# Patient Record
Sex: FEMALE | Race: WHITE
[De-identification: names, ages, dates, MRNs, and addresses within clinical notes are randomized per-mention and may not be internally consistent; named-entity substitution may affect disease eponyms.]

---

## 2019-02-08 ENCOUNTER — HOSPITAL ENCOUNTER (EMERGENCY)
Dept: HOSPITAL 80 - FED | Age: 59
Discharge: HOME | End: 2019-02-08
Payer: COMMERCIAL

## 2019-02-08 VITALS — SYSTOLIC BLOOD PRESSURE: 154 MMHG | DIASTOLIC BLOOD PRESSURE: 67 MMHG

## 2019-02-08 DIAGNOSIS — R11.2: Primary | ICD-10-CM

## 2019-02-08 DIAGNOSIS — Z87.19: ICD-10-CM

## 2019-02-08 DIAGNOSIS — R10.32: ICD-10-CM

## 2019-02-08 LAB — PLATELET # BLD: 324 10^3/UL (ref 150–400)

## 2019-02-08 PROCEDURE — 96361 HYDRATE IV INFUSION ADD-ON: CPT

## 2019-02-08 PROCEDURE — 96374 THER/PROPH/DIAG INJ IV PUSH: CPT

## 2019-02-08 PROCEDURE — 96376 TX/PRO/DX INJ SAME DRUG ADON: CPT

## 2019-02-08 PROCEDURE — 96375 TX/PRO/DX INJ NEW DRUG ADDON: CPT

## 2019-02-08 PROCEDURE — G0480 DRUG TEST DEF 1-7 CLASSES: HCPCS

## 2019-02-08 PROCEDURE — 74177 CT ABD & PELVIS W/CONTRAST: CPT

## 2019-02-08 PROCEDURE — 99285 EMERGENCY DEPT VISIT HI MDM: CPT

## 2019-02-08 NOTE — EDPHY
H & P


Stated Complaint: LLQ abd pain, n/v, "feels like my pancreatitis"


Source: Patient, EMS


Exam Limitations: No limitations





- Personal History


Current Tetanus Diphtheria and Acellular Pertussis (TDAP): Yes


Tetanus Vaccine Date: 2016





- Medical/Surgical History


Hx Asthma: No


Hx Chronic Respiratory Disease: No


Hx Diabetes: No


Hx Cardiac Disease: No


Hx Renal Disease: No


Hx Cirrhosis: No


Hx Alcoholism: No


Hx HIV/AIDS: No


Hx Splenectomy or Spleen Trauma: No


Other PMH: Divertiulitis, pancreatitis





- Social History


Smoking Status: Heavy smoker


Time Seen by Provider: 02/08/19 06:00


HPI/ROS: 





HPI


The patient presents with left lower quadrant abdominal pain for the last 

several hours which feels like her prior episodes of pancreatitis, she is 

brought in by ambulance from her car outside of a bar.  She and her  

were supposed to clean the bar but she was unable to because of the pain.  The 

pain is associated with nausea and vomiting, has been constant and is moderate 

in severity.  She has had this pain before with pancreatitis.  She had 1 

episode of pancreatitis 1 year ago and another about 15 years ago.  She does 

not drink alcohol.





REVIEW OF SYSTEMS


10 systems were reviewed and negative with the exception of the elements 

mentioned in the history of present illness.





PMHx:  History of pancreatitis, reported history of diverticulitis





Soc Hx:  Lives in [place], does not have a primary care doctor








PHYSICAL


General Appearance: Alert, uncomfortable appearing, dry heaving


Eyes: Pupils equal and round no pallor or injection


ENT, Mouth: Mucous membranes moist


Respiratory: There are no retractions, lungs are clear to auscultation


Cardiovascular:  Regular rate and rhythm 


Gastrointestinal:  Abdomen is soft, exam is inconsistent, sometimes she jumps 

when I press her left abdomen and sometimes she does not move


Neurological:  A&O, moves all extremities


Skin:  Warm and dry, no rashes


Musculoskeletal: Neck is supple non tender 


Extremities:  symmetrical, full range of motion 


Psychiatric:  Patient is oriented X 3, there is no agitation 


 (RiguzziMarcelina)


Constitutional: 


 Initial Vital Signs











Temperature (C)  36.4 C   02/08/19 06:00


 


Heart Rate  81   02/08/19 06:00


 


Respiratory Rate  20   02/08/19 06:00


 


Blood Pressure  184/109 H  02/08/19 06:00


 


O2 Sat (%)  100   02/08/19 06:00








 











O2 Delivery Mode               Room Air














Allergies/Adverse Reactions: 


 





Penicillins Allergy (Verified 02/08/19 06:00)


 








Home Medications: 














 Medication  Instructions  Recorded


 


Ondansetron Odt [Zofran Odt] 4 mg PO Q4PRN PRN #4 tab 02/08/19


 


Seroquel  02/08/19














Medical Decision Making





- Diagnostics


Imaging Results: 





CT abdomen and pelvis reviewed by me and discussed with Dr. Oppenheimer shows 

no acute pathology.  Mild intrahepatic duct dilatation.  This may be from her 

previous cholecystectomy or possible retained common bile duct stone.  We have 

no previous films for comparison (Elmer Patel)


ED Course/Re-evaluation: 





Care was signed over to me at 7:00 a.m..  I reviewed the labs.  Again I 

reviewed the CT.  Re-evaluation of patient at 8:15 a.m..  She and I discussed 

imaging study results, laboratory evaluation, treatment plan including criteria 

for return importance of follow-up and further evaluation.  She expresses 

understanding





Patient tells me she is still nauseated however there is no vomiting.  She will 

be given another L of saline and further Zofran.





Re-evaluation again at 9:35 a.m..  Patient is stable.  No vomiting or diarrhea.

  She feels well to be discharged. (Elmer Patel)


Differential Diagnosis: 





58-year-old female presents with 4 hr of his left-sided abdominal pain 

associated with nausea and vomiting which started suddenly which feels like 

prior pancreatitis which sounds to be idiopathic in history.





I met the paramedics at the bedside to obtain their report.  Here, she was 

difficult to place an IV.  We eventually ended up placing an EJ.  She was given 

IV fluids, antiemetics, Toradol, Haldol for her symptoms with some improvement.





Labs were unremarkable including lipase.  Given patient's ongoing symptoms, 

plan was for CT scan of abdomen pelvis.  At approximately 7:15 a.m., the case 

is signed out to the oncoming provider Dr. Patel to follow up on patient's CT 

scan. (Marcelina Strong)





No evidence for pancreatitis or acute abdominal pathology (Elmer Patel)





- Data Points


Laboratory Results: 


 Laboratory Results





 02/08/19 06:30 





 02/08/19 06:30 





 











  02/08/19 02/08/19 02/08/19





  07:30 06:30 06:30


 


WBC      7.99 10^3/uL 10^3/uL





     (3.80-9.50) 


 


RBC      4.45 10^6/uL 10^6/uL





     (4.18-5.33) 


 


Hgb      13.2 g/dL g/dL





     (12.6-16.3) 


 


Hct      39.8 % %





     (38.0-47.0) 


 


MCV      89.4 fL fL





     (81.5-99.8) 


 


MCH      29.7 pg pg





     (27.9-34.1) 


 


MCHC      33.2 g/dL g/dL





     (32.4-36.7) 


 


RDW      14.6 % %





     (11.5-15.2) 


 


Plt Count      324 10^3/uL 10^3/uL





     (150-400) 


 


MPV      8.9 fL fL





     (8.7-11.7) 


 


Neut % (Auto)      67.6 % %





     (39.3-74.2) 


 


Lymph % (Auto)      24.5 % %





     (15.0-45.0) 


 


Mono % (Auto)      4.4 % L %





     (4.5-13.0) 


 


Eos % (Auto)      2.5 % %





     (0.6-7.6) 


 


Baso % (Auto)      0.6 % %





     (0.3-1.7) 


 


Nucleat RBC Rel Count      0.0 % %





     (0.0-0.2) 


 


Absolute Neuts (auto)      5.40 10^3/uL 10^3/uL





     (1.70-6.50) 


 


Absolute Lymphs (auto)      1.96 10^3/uL 10^3/uL





     (1.00-3.00) 


 


Absolute Monos (auto)      0.35 10^3/uL 10^3/uL





     (0.30-0.80) 


 


Absolute Eos (auto)      0.20 10^3/uL 10^3/uL





     (0.03-0.40) 


 


Absolute Basos (auto)      0.05 10^3/uL 10^3/uL





     (0.02-0.10) 


 


Absolute Nucleated RBC      0.00 10^3/uL 10^3/uL





     (0-0.01) 


 


Immature Gran %      0.4 % %





     (0.0-1.1) 


 


Immature Gran #      0.03 10^3/uL 10^3/uL





     (0.00-0.10) 


 


Sodium    139 mEq/L mEq/L  





    (135-145)  


 


Potassium    4.2 mEq/L mEq/L  





    (3.5-5.2)  


 


Chloride    109 mEq/L mEq/L  





    ()  


 


Carbon Dioxide    23 mEq/l mEq/l  





    (22-31)  


 


Anion Gap    7 mEq/L mEq/L  





    (6-14)  


 


BUN    17 mg/dL mg/dL  





    (7-23)  


 


Creatinine    0.6 mg/dL mg/dL  





    (0.6-1.0)  


 


Estimated GFR    > 60   





    


 


Glucose    128 mg/dL H mg/dL  





    ()  


 


Calcium    9.9 mg/dL mg/dL  





    (8.5-10.4)  


 


Total Bilirubin    0.5 mg/dL mg/dL  





    (0.1-1.4)  


 


Conjugated Bilirubin    0.1 mg/dL mg/dL  





    (0.0-0.5)  


 


Unconjugated Bilirubin    0.4 mg/dL mg/dL  





    (0.0-1.1)  


 


AST    26 IU/L IU/L  





    (14-46)  


 


ALT    27 IU/L IU/L  





    (9-52)  


 


Alkaline Phosphatase    86 IU/L IU/L  





    ()  


 


Total Protein    7.5 g/dL g/dL  





    (6.3-8.2)  


 


Albumin    4.6 g/dL g/dL  





    (3.5-5.0)  


 


Lipase    37 IU/L IU/L  





    ()  


 


Urine Color  PALE YELLOW     





    


 


Urine Appearance  CLEAR     





    


 


Urine pH  6.0     





   (5.0-7.5)   


 


Ur Specific Gravity  > 1.035  H     





   (1.002-1.030)   


 


Urine Protein  NEGATIVE     





   (NEGATIVE)   


 


Urine Ketones  TRACE  H     





   (NEGATIVE)   


 


Urine Blood  NEGATIVE     





   (NEGATIVE)   


 


Urine Nitrate  NEGATIVE     





   (NEGATIVE)   


 


Urine Bilirubin  NEGATIVE     





   (NEGATIVE)   


 


Urine Urobilinogen  NEGATIVE EU EU    





   (0.2-1.0)   


 


Ur Leukocyte Esterase  NEGATIVE     





   (NEGATIVE)   


 


Urine RBC  NONE SEEN /hpf /hpf    





   (0-3)   


 


Urine WBC  0-1 /hpf /hpf    





   (0-3)   


 


Ur Epithelial Cells  NONE SEEN /lpf /lpf    





   (NONE-1+)   


 


Urine Glucose  1+  H     





   (NEGATIVE)   


 


Ethyl Alcohol    < 10 mg/dL mg/dL  





    (0-10)  











Medications Given: 


 








Discontinued Medications





Diphenhydramine HCl (Benadryl Injection)  25 mg IVP EDNOW ONE


   Stop: 02/08/19 06:40


   Last Admin: 02/08/19 06:41 Dose:  25 mg


Haloperidol Lactate (Haldol Injection)  2.5 mg IVP EDNOW ONE


   Stop: 02/08/19 06:06


   Last Admin: 02/08/19 06:27 Dose:  2.5 mg


Haloperidol Lactate (Haldol Injection)  2.5 mg IVP EDNOW ONE


   Stop: 02/08/19 06:40


   Last Admin: 02/08/19 06:41 Dose:  2.5 mg


Haloperidol Lactate (Haldol Injection)  5 mg IVP EDNOW ONE


   Stop: 02/08/19 06:54


   Last Admin: 02/08/19 06:55 Dose:  5 mg


Sodium Chloride (Ns)  1,000 mls @ 0 mls/hr IV EDNOW ONE; Wide Open


   PRN Reason: Protocol


   Stop: 02/08/19 06:01


   Last Admin: 02/08/19 06:27 Dose:  1,000 mls


Sodium Chloride (Ns)  1,000 mls @ 0 mls/hr IV EDNOW ONE; Wide Open


   PRN Reason: Protocol


   Stop: 02/08/19 08:23


   Last Admin: 02/08/19 08:32 Dose:  1,000 mls


Ketorolac Tromethamine (Toradol)  15 mg IVP EDNOW ONE


   Stop: 02/08/19 06:01


   Last Admin: 02/08/19 06:26 Dose:  15 mg


Ondansetron HCl (Zofran)  4 mg IVP EDNOW ONE


   Stop: 02/08/19 06:01


   Last Admin: 02/08/19 06:27 Dose:  4 mg


Ondansetron HCl (Zofran)  4 mg IVP EDNOW ONE


   Stop: 02/08/19 08:23


   Last Admin: 02/08/19 08:32 Dose:  4 mg


Promethazine HCl (Phenergan)  25 mg IVP EDNOW ONE


   Stop: 02/08/19 07:21


   Last Admin: 02/08/19 07:25 Dose:  25 mg








Departure





- Departure


Disposition: Home, Routine, Self-Care


Clinical Impression: 


Abdominal pain


Qualifiers:


 Abdominal location: generalized Qualified Code(s): R10.84 - Generalized 

abdominal pain





Condition: Good


Instructions:  Acute Abdominal Pain (ED), Acute Nausea and Vomiting (ED)


Additional Instructions: 


Zofran if needed for nausea and vomiting.  1 pill every 4-6 hours as needed for 

nausea and vomiting





Frequent, small sips fluids.  Gradual diet advancement





Return for worsening symptoms





Re-evaluation 2-3 days if not improved








Referrals: 


Patient,NotPresent [Unknown] - As per Instructions


Reji Hurley MD [Medical Doctor] - 2-3 days, if not improved


Prescriptions: 


Ondansetron Odt [Zofran Odt] 4 mg PO Q4PRN PRN #4 tab


 PRN Reason: Nausea/Vomiting, Use 1st